# Patient Record
Sex: MALE | Race: OTHER | Employment: FULL TIME | URBAN - METROPOLITAN AREA
[De-identification: names, ages, dates, MRNs, and addresses within clinical notes are randomized per-mention and may not be internally consistent; named-entity substitution may affect disease eponyms.]

---

## 2018-08-03 ENCOUNTER — HOSPITAL ENCOUNTER (OUTPATIENT)
Dept: LAB | Age: 38
Discharge: HOME | End: 2018-08-03

## 2018-08-03 LAB
ALBUMIN SERPL-MCNC: 4.1 G/DL (ref 3.5–5)
ALBUMIN/GLOB SERPL: 1.2 {RATIO} (ref 1.1–2.2)
ALP SERPL-CCNC: 75 U/L (ref 45–117)
ALT SERPL-CCNC: 38 U/L (ref 12–78)
ANION GAP SERPL CALC-SCNC: 3 MMOL/L (ref 5–15)
AST SERPL-CCNC: 18 U/L (ref 15–37)
BILIRUB SERPL-MCNC: 0.6 MG/DL (ref 0.2–1)
BUN SERPL-MCNC: 9 MG/DL (ref 6–20)
BUN/CREAT SERPL: 13 (ref 12–20)
CALCIUM SERPL-MCNC: 8.8 MG/DL (ref 8.5–10.1)
CHLORIDE SERPL-SCNC: 105 MMOL/L (ref 97–108)
CHOLEST SERPL-MCNC: 206 MG/DL
CO2 SERPL-SCNC: 31 MMOL/L (ref 21–32)
CREAT SERPL-MCNC: 0.69 MG/DL (ref 0.7–1.3)
GLOBULIN SER CALC-MCNC: 3.4 G/DL (ref 2–4)
GLUCOSE SERPL-MCNC: 89 MG/DL (ref 65–100)
HDLC SERPL-MCNC: 46 MG/DL
HDLC SERPL: 4.5 {RATIO} (ref 0–5)
LDLC SERPL CALC-MCNC: 126.8 MG/DL (ref 0–100)
LIPID PROFILE,FLP: ABNORMAL
POTASSIUM SERPL-SCNC: 4.3 MMOL/L (ref 3.5–5.1)
PROT SERPL-MCNC: 7.5 G/DL (ref 6.4–8.2)
SODIUM SERPL-SCNC: 139 MMOL/L (ref 136–145)
TRIGL SERPL-MCNC: 166 MG/DL (ref ?–150)
VLDLC SERPL CALC-MCNC: 33.2 MG/DL

## 2018-08-03 PROCEDURE — 80061 LIPID PANEL: CPT | Performed by: NURSE PRACTITIONER

## 2018-08-03 PROCEDURE — 80053 COMPREHEN METABOLIC PANEL: CPT | Performed by: NURSE PRACTITIONER

## 2018-09-27 ENCOUNTER — HOSPITAL ENCOUNTER (OUTPATIENT)
Dept: GENERAL RADIOLOGY | Age: 38
Discharge: HOME | End: 2018-09-27
Payer: SUBSIDIZED

## 2018-09-27 DIAGNOSIS — M25.561 RIGHT KNEE PAIN: ICD-10-CM

## 2018-09-27 PROCEDURE — 73562 X-RAY EXAM OF KNEE 3: CPT

## 2019-12-16 ENCOUNTER — TELEPHONE (OUTPATIENT)
Dept: FAMILY MEDICINE CLINIC | Age: 39
End: 2019-12-16

## 2019-12-16 ENCOUNTER — HOSPITAL ENCOUNTER (EMERGENCY)
Age: 39
Discharge: HOME | End: 2019-12-16
Attending: STUDENT IN AN ORGANIZED HEALTH CARE EDUCATION/TRAINING PROGRAM
Payer: SELF-PAY

## 2019-12-16 VITALS
DIASTOLIC BLOOD PRESSURE: 84 MMHG | OXYGEN SATURATION: 97 % | SYSTOLIC BLOOD PRESSURE: 130 MMHG | HEART RATE: 107 BPM | BODY MASS INDEX: 33.13 KG/M2 | RESPIRATION RATE: 16 BRPM | TEMPERATURE: 99.6 F | WEIGHT: 180 LBS | HEIGHT: 62 IN

## 2019-12-16 DIAGNOSIS — E86.0 DEHYDRATION: ICD-10-CM

## 2019-12-16 DIAGNOSIS — H66.90 ACUTE OTITIS MEDIA, UNSPECIFIED OTITIS MEDIA TYPE: Primary | ICD-10-CM

## 2019-12-16 DIAGNOSIS — R00.0 TACHYCARDIA: ICD-10-CM

## 2019-12-16 DIAGNOSIS — R51.9 NONINTRACTABLE HEADACHE, UNSPECIFIED CHRONICITY PATTERN, UNSPECIFIED HEADACHE TYPE: ICD-10-CM

## 2019-12-16 LAB
FLUAV AG NPH QL IA: NEGATIVE
FLUBV AG NOSE QL IA: NEGATIVE

## 2019-12-16 PROCEDURE — 99284 EMERGENCY DEPT VISIT MOD MDM: CPT

## 2019-12-16 PROCEDURE — 74011250637 HC RX REV CODE- 250/637: Performed by: NURSE PRACTITIONER

## 2019-12-16 PROCEDURE — 74011250636 HC RX REV CODE- 250/636: Performed by: NURSE PRACTITIONER

## 2019-12-16 PROCEDURE — 96361 HYDRATE IV INFUSION ADD-ON: CPT

## 2019-12-16 PROCEDURE — 87804 INFLUENZA ASSAY W/OPTIC: CPT

## 2019-12-16 PROCEDURE — 96360 HYDRATION IV INFUSION INIT: CPT

## 2019-12-16 RX ORDER — OFLOXACIN 3 MG/ML
5 SOLUTION AURICULAR (OTIC) DAILY
Qty: 10 ML | Refills: 0 | Status: SHIPPED | OUTPATIENT
Start: 2019-12-16

## 2019-12-16 RX ORDER — ACETAMINOPHEN 325 MG/1
650 TABLET ORAL
Status: COMPLETED | OUTPATIENT
Start: 2019-12-16 | End: 2019-12-16

## 2019-12-16 RX ORDER — AMOXICILLIN AND CLAVULANATE POTASSIUM 875; 125 MG/1; MG/1
1 TABLET, FILM COATED ORAL 2 TIMES DAILY
Qty: 14 TAB | Refills: 0 | Status: SHIPPED | OUTPATIENT
Start: 2019-12-16 | End: 2019-12-23

## 2019-12-16 RX ADMIN — SODIUM CHLORIDE 1000 ML: 900 INJECTION, SOLUTION INTRAVENOUS at 10:58

## 2019-12-16 RX ADMIN — ACETAMINOPHEN 650 MG: 325 TABLET ORAL at 11:11

## 2019-12-16 NOTE — ED PROVIDER NOTES
This is a 49-year-old male who presents ambulatory to the have a headache, and bilateral ear pain. Patient states the pain started 2 days ago, started having drainage from the right ear today prompting an emergency room visit. Patient states the pain is 10 out of 10 in his right ear, he has not taken any medication prior to arrival for his pain. States that drainages pink-colored, showing this provider with a cotton ball. Denies chest pain, shortness of breath, dizziness, trauma to the ear canals. Denies any nausea or vomiting, fevers or chills. There are no further complaints at this time.     Manuel Flores MD  Past Medical History:  No date: External hemorrhoid, thrombosed  No date: GERD (gastroesophageal reflux disease)  Past Surgical History:  2006: HX APPENDECTOMY  2006: HX HERNIA REPAIR      Comment:  inguinal hernia             Past Medical History:   Diagnosis Date    External hemorrhoid, thrombosed     GERD (gastroesophageal reflux disease)        Past Surgical History:   Procedure Laterality Date    HX APPENDECTOMY  2006    HX HERNIA REPAIR  2006    inguinal hernia         Family History:   Problem Relation Age of Onset    Hypertension Mother     Heart Disease Mother        Social History     Socioeconomic History    Marital status:      Spouse name: Not on file    Number of children: Not on file    Years of education: Not on file    Highest education level: Not on file   Occupational History    Not on file   Social Needs    Financial resource strain: Not on file    Food insecurity:     Worry: Not on file     Inability: Not on file    Transportation needs:     Medical: Not on file     Non-medical: Not on file   Tobacco Use    Smoking status: Former Smoker    Smokeless tobacco: Never Used   Substance and Sexual Activity    Alcohol use: No    Drug use: No    Sexual activity: Yes     Partners: Female   Lifestyle    Physical activity:     Days per week: Not on file Minutes per session: Not on file    Stress: Not on file   Relationships    Social connections:     Talks on phone: Not on file     Gets together: Not on file     Attends Restorationist service: Not on file     Active member of club or organization: Not on file     Attends meetings of clubs or organizations: Not on file     Relationship status: Not on file    Intimate partner violence:     Fear of current or ex partner: Not on file     Emotionally abused: Not on file     Physically abused: Not on file     Forced sexual activity: Not on file   Other Topics Concern    Not on file   Social History Narrative    ** Merged History Encounter **              ALLERGIES: Bee sting [sting, bee]    Review of Systems   Constitutional: Negative for activity change, appetite change, chills, fatigue and fever. HENT: Positive for congestion, ear discharge, ear pain, sinus pressure and sinus pain. Negative for trouble swallowing. Eyes: Negative for photophobia, pain, redness, itching and visual disturbance. Respiratory: Negative for chest tightness and shortness of breath. Cardiovascular: Negative for chest pain and palpitations. Gastrointestinal: Negative for abdominal distention, abdominal pain, nausea and vomiting. Endocrine: Negative. Genitourinary: Negative for difficulty urinating, frequency and urgency. Musculoskeletal: Negative for back pain, neck pain and neck stiffness. Skin: Negative for color change, pallor, rash and wound. Allergic/Immunologic: Negative. Neurological: Positive for headaches. Negative for dizziness, syncope and weakness. Hematological: Does not bruise/bleed easily. Psychiatric/Behavioral: Negative for behavioral problems. The patient is not nervous/anxious.         Vitals:    12/16/19 0956   BP: 124/80   Pulse: (!) 113   Resp: 16   Temp: 99.6 °F (37.6 °C)   SpO2: 97%   Weight: 81.6 kg (180 lb)   Height: 5' 2\" (1.575 m)            Physical Exam  Vitals signs and nursing note reviewed. Exam conducted with a chaperone present. Constitutional:       General: He is not in acute distress. Appearance: Normal appearance. He is well-developed. HENT:      Head: Normocephalic and atraumatic. Right Ear: External ear normal.      Left Ear: External ear normal.      Ears:      Comments: Bilateral otitis media     Nose: Congestion present. Eyes:      General:         Right eye: No discharge. Left eye: No discharge. Conjunctiva/sclera: Conjunctivae normal.      Pupils: Pupils are equal, round, and reactive to light. Neck:      Musculoskeletal: Normal range of motion and neck supple. Vascular: No JVD. Trachea: No tracheal deviation. Cardiovascular:      Rate and Rhythm: Normal rate and regular rhythm. Heart sounds: Normal heart sounds. No murmur. No gallop. Pulmonary:      Effort: Pulmonary effort is normal. No respiratory distress. Breath sounds: Normal breath sounds. No wheezing or rales. Chest:      Chest wall: No tenderness. Abdominal:      General: Bowel sounds are normal. There is no distension. Palpations: Abdomen is soft. Tenderness: There is no tenderness. There is no guarding or rebound. Genitourinary:     Comments: Deferred    Musculoskeletal: Normal range of motion. General: No tenderness. Skin:     General: Skin is warm and dry. Capillary Refill: Capillary refill takes less than 2 seconds. Coloration: Skin is not pale. Findings: No erythema or rash. Neurological:      General: No focal deficit present. Mental Status: He is alert and oriented to person, place, and time. Motor: No weakness. Coordination: Coordination normal.   Psychiatric:         Mood and Affect: Mood normal.         Behavior: Behavior normal.         Thought Content:  Thought content normal.         Judgment: Judgment normal.          MDM  Number of Diagnoses or Management Options  Acute otitis media, unspecified otitis media type: new and requires workup  Dehydration: new and requires workup  Nonintractable headache, unspecified chronicity pattern, unspecified headache type: new and requires workup  Tachycardia: new and requires workup  Diagnosis management comments: Plan discharge to home and follow-up with PCP. Follow-up with ENT as an outpatient. P.o. antibiotics, antibiotic drops for his ear. Return to the emergency room with worsening symptoms. Patient in agreement with plan of care. 11:59 AM  Pt has been reexamined. Pt has no new complaints, changes or physical findings. Care plan outlined and precautions discussed. All available results were reviewed with pt. All medications were reviewed with pt. All of pt's questions and concerns were addressed. Pt agrees to F/U as instructed and agrees to return to ED upon further deterioration. Pt is ready to go home.   Tank Fitch NP      Procedures

## 2019-12-16 NOTE — TELEPHONE ENCOUNTER
Patient calling, used [employer]-agent id [id number]    Notes ear infection, notes pain Notes pain level 10. No current openings at time of call. Nurse Brian Valenzuela.  Took call

## 2019-12-16 NOTE — ED TRIAGE NOTES
Patient reports 2 days of headache, ear pain and drainage from the right ear- Patient reports B/L ear pain with drainage only coming from the right ear.

## 2019-12-16 NOTE — DISCHARGE INSTRUCTIONS
Patient Education        Deshidratación: Ann Marie Diaz - [ Dehydration: Care Instructions ]  Instrucciones de cuidado  La deshidratación ocurre cuando el cuerpo pierde demasiado líquido. Puede ocurrir cuando usted no nolberto suficiente agua o pierde grandes cantidades de líquidos del organismo debido a la diarrea, el vómito o la sudoración. La deshidratación grave puede ser mortal.  El agua y los minerales llamados electrolitos ayudan a recuperar el equilibrio de los líquidos en el organismo. Aprenda las señales tempranas de pérdida de líquido y noa más líquidos para prevenir la deshidratación. La atención de seguimiento es brendon parte clave de elaine tratamiento y seguridad. Asegúrese de hacer y acudir a todas las citas, y llame a elaine médico si está teniendo problemas. También es brendon buena idea saber los resultados de brenda exámenes y mantener brendon lista de los medicamentos que umang. ¿Cómo puede cuidarse en el hogar? · Para prevenir la deshidratación, noa abundantes líquidos, los suficientes sea para que elaine orina sea de color amarillo lupis o transparente sea el agua. Elija beber agua y otros líquidos ester sin cafeína hasta que se sienta mejor. Si tiene [employer] [facility] [employer], del corazón o del hígado y tiene que Bere's líquidos, hable con elaine médico antes de aumentar elaine consumo. · Si no siente ganas de comer o beber, trate de caty sorbos pequeños de agua, bebidas deportivas u otras bebidas rehidratantes. · Descanse lo suficiente. Cómo prevenir la deshidratación  · Chanetta Birks líquidos a elaine Tamara Lux y elaine rutina diaria, a menos que elaine médico le haya dicho lo contrario. · Noa más líquidos cuando tello calor. Noa aún más líquidos si hace mucho ejercicio. No tome bebidas con alcohol o cafeína. · Esté alerta a los síntomas de deshidratación. Estos incluyen:  ? [place] y pegajosa. ? Yvonnie Mall, y en poca cantidad. ? Ojos secos y hundidos. ? Sentirse muy cansado.   · Manish Console problemas pueden llevar a la deshidratación. Estos incluyen:  ? Diarrea, fiebre y vómito. ? Cualquier enfermedad con fiebre, sea la neumonía o la gripe. ? Actividades que causan mucha sudoración, sea tiana de resistencia y [place] felton en exteriores en un clima cálido o húmedo. ? Abuso o síndrome de abstinencia del alcohol o las drogas. ? Ciertos medicamentos, sea pastillas para el resfriado y la alergia (antihistamínicos), pastillas para adelgazar (diuréticos) y laxantes. ? Ciertas enfermedades, sea la diabetes, el cáncer, y la enfermedad del corazón o [place] Silver. ¿Cuándo debe pedir ayuda? Llame al 911 en cualquier momento que considere que necesita atención de [place]. Por ejemplo, llame si:    · Se desmayó (perdió el conocimiento).    Llame a elaine médico ahora mismo o busque atención médica inmediata si:    · Se siente confuso y no puede pensar con claridad.     · Siente mareos o aturdimiento, o que está a punto de desmayarse.     · Tiene señales de necesitar más líquidos. Tiene los ojos hundidos, la boca seca y [place] solo poca cantidad de color oscuro.     · No puede retener líquidos en el estómago.    Preste especial atención a los cambios en elaine jaison y asegúrese de comunicarse con elaine médico si:    · No produce lágrimas.     · Tiene la piel muy seca y esta regresa despacio a elaine lugar después de pellizcarla.     · Tiene la boca y los ojos muy secos. ¿Dónde puede encontrar más información en inglés? Ben Griffith a http://anthony-ryne.info/. Roxann Flemingo L340 en la búsqueda para aprender Arvilla Radha de \"Deshidratación: Instrucciones de cuidado - [ Dehydration: Care Instructions ]. \"  Revisado: 26 junio, 2019  Versión del contenido: 12.2  © 6370-5033 [employer]. Las instrucciones de cuidado fueron adaptadas bajo licencia por [employer] (which disclaims liability or warranty for this information).  Si usted tiene [place] afección médica o sobre estas instrucciones, siempre pregunte a elaine profesional de jaison. [facility] [employer] niega toda garantía o responsabilidad por elaine uso de esta información. Patient Education        Infección del oído (otitis media): Instrucciones de cuidado - [ Ear Infection (Otitis Media): Care Instructions ]  Instrucciones de cuidado    Brendon infección de oído podría comenzar con un resfriado y afectar el oído medio (otitis media). Puede doler mucho. La mayoría de las infecciones de oído sanan por sí solas en un par de días. A menudo, no se necesitan antibióticos. La razón es que muchas infecciones de oído están causadas por virus. Los antibióticos no funcionan contra los virus. Las dosis regulares de analgésicos (medicamentos para el dolor) son la mejor manera de reducir la fiebre y ayudarle a sentirse mejor. La atención de seguimiento es brendon parte clave de elaine tratamiento y seguridad. Asegúrese de hacer y acudir a todas las citas, y llame a elaine médico si está teniendo problemas. También es brendon buena idea saber los resultados de brenda exámenes y mantener brendon lista de los medicamentos que umang. ¿Cómo puede cuidarse en el hogar? · [employer] analgésicos exactamente sea le fueron indicados. ? Si el médico le recetó un analgésico, tómelo según las indicaciones. ? Si no está tomando un analgésico recetado, tome verónica de venta ayaz, sea acetaminofén (Tylenol), ibuprofeno (Advil, Motrin) o naproxeno (Aleve). Karen y siga todas las instrucciones de la [place]. ? No tome dos o más analgésicos al mismo tiempo a menos que el médico se lo haya indicado. Muchos analgésicos contienen acetaminofén, es decir, Tylenol. El exceso de acetaminofén (Tylenol) puede ser dañino. · Planee tomarse brendon dosis completa de analgésico antes de acostarse. Dormir lo suficiente le ayudará a sentirse mejor. · Pruebe con brendon toallita tibia húmeda en el oído. Raffi vez le ayude a aliviar el dolor.   · Si elaine médico le recetó antibióticos, tómelos según las indicaciones. No deje de tomarlos por el hecho de sentirse mejor. Debe caty todos los antibióticos hasta terminarlos. ¿Cuándo debe pedir ayuda? Llame a elaine médico ahora mismo o busque atención médica inmediata si:    · Tiene dolor de oído nuevo o que aumenta.     · Elaine oído presenta secreción de pus o heather nueva o que está aumentando.     · Tiene fiebre junto con rigidez en el bernardo o un dolor de russell intenso.    Preste especial atención a los cambios en elaine jaison y asegúrese de comunicarse con elaine médico si:    · Tiene síntomas nuevos o que empeoran.     · No mejora después de hebert tomado un antibiótico ammon 2 días. ¿Dónde puede encontrar más información en inglés? Conchis Ochoa a http://anthony-ryne.info/. Jennifer Lavon en la búsqueda para aprender más acerca de \"Infección del oído (otitis media): Instrucciones de cuidado - [ Ear Infection (Otitis Media): Care Instructions ]. \"  Revisado: 21 octubre, 2018  Versión del contenido: 12.2  © 1356-2646 [employer]wise, Incorporated. Las instrucciones de cuidado fueron adaptadas bajo licencia por [employer] (which disclaims liability or warranty for this information). Si usted tiene [place] afección médica o sobre estas instrucciones, siempre pregunte a elaine profesional de jaison. [employer], Incorporated niega toda garantía o responsabilidad por elaine uso de esta información.

## 2019-12-16 NOTE — ED NOTES
Patient given discharge instructions per provider, 2 prescriptions given. Patient verbalized understanding. Ambulatory from ED to home with friend.

## 2025-01-14 ENCOUNTER — APPOINTMENT (OUTPATIENT)
Facility: HOSPITAL | Age: 45
End: 2025-01-14

## 2025-01-14 ENCOUNTER — HOSPITAL ENCOUNTER (EMERGENCY)
Facility: HOSPITAL | Age: 45
Discharge: HOME | End: 2025-01-14
Attending: STUDENT IN AN ORGANIZED HEALTH CARE EDUCATION/TRAINING PROGRAM

## 2025-01-14 VITALS
SYSTOLIC BLOOD PRESSURE: 103 MMHG | DIASTOLIC BLOOD PRESSURE: 71 MMHG | BODY MASS INDEX: 28.65 KG/M2 | TEMPERATURE: 98.1 F | RESPIRATION RATE: 28 BRPM | HEIGHT: 65 IN | HEART RATE: 79 BPM | WEIGHT: 171.96 LBS | OXYGEN SATURATION: 96 %

## 2025-01-14 DIAGNOSIS — R10.84 GENERALIZED ABDOMINAL PAIN: ICD-10-CM

## 2025-01-14 DIAGNOSIS — R51.9 ACUTE NONINTRACTABLE HEADACHE, UNSPECIFIED HEADACHE TYPE: ICD-10-CM

## 2025-01-14 DIAGNOSIS — R07.9 CHEST PAIN, UNSPECIFIED TYPE: ICD-10-CM

## 2025-01-14 DIAGNOSIS — R52 BODY ACHES: Primary | ICD-10-CM

## 2025-01-14 LAB
ALBUMIN SERPL-MCNC: 3.8 G/DL (ref 3.5–5)
ALBUMIN/GLOB SERPL: 1 (ref 1.1–2.2)
ALP SERPL-CCNC: 83 U/L (ref 45–117)
ALT SERPL-CCNC: 45 U/L (ref 12–78)
ANION GAP SERPL CALC-SCNC: 5 MMOL/L (ref 2–12)
AST SERPL-CCNC: 26 U/L (ref 15–37)
BASOPHILS # BLD: 0.06 K/UL (ref 0–0.1)
BASOPHILS NFR BLD: 1 % (ref 0–1)
BILIRUB SERPL-MCNC: 0.8 MG/DL (ref 0.2–1)
BUN SERPL-MCNC: 13 MG/DL (ref 6–20)
BUN/CREAT SERPL: 14 (ref 12–20)
CALCIUM SERPL-MCNC: 8.8 MG/DL (ref 8.5–10.1)
CHLORIDE SERPL-SCNC: 104 MMOL/L (ref 97–108)
CO2 SERPL-SCNC: 28 MMOL/L (ref 21–32)
CREAT SERPL-MCNC: 0.94 MG/DL (ref 0.7–1.3)
DIFFERENTIAL METHOD BLD: NORMAL
EKG ATRIAL RATE: 98 BPM
EKG DIAGNOSIS: NORMAL
EKG P AXIS: 33 DEGREES
EKG P-R INTERVAL: 176 MS
EKG Q-T INTERVAL: 328 MS
EKG QRS DURATION: 88 MS
EKG QTC CALCULATION (BAZETT): 418 MS
EKG R AXIS: -18 DEGREES
EKG T AXIS: 21 DEGREES
EKG VENTRICULAR RATE: 98 BPM
EOSINOPHIL # BLD: 0 K/UL (ref 0–0.4)
EOSINOPHIL NFR BLD: 0 % (ref 0–7)
ERYTHROCYTE [DISTWIDTH] IN BLOOD BY AUTOMATED COUNT: 12.5 % (ref 11.5–14.5)
FLUAV RNA SPEC QL NAA+PROBE: NOT DETECTED
FLUBV RNA SPEC QL NAA+PROBE: NOT DETECTED
GLOBULIN SER CALC-MCNC: 3.9 G/DL (ref 2–4)
GLUCOSE SERPL-MCNC: 107 MG/DL (ref 65–100)
HCT VFR BLD AUTO: 43.5 % (ref 36.6–50.3)
HGB BLD-MCNC: 15 G/DL (ref 12.1–17)
IMM GRANULOCYTES # BLD AUTO: 0 K/UL (ref 0–0.04)
IMM GRANULOCYTES NFR BLD AUTO: 0 % (ref 0–0.5)
LYMPHOCYTES # BLD: 1.41 K/UL (ref 0.8–3.5)
LYMPHOCYTES NFR BLD: 22 % (ref 12–49)
MCH RBC QN AUTO: 31.8 PG (ref 26–34)
MCHC RBC AUTO-ENTMCNC: 34.5 G/DL (ref 30–36.5)
MCV RBC AUTO: 92.2 FL (ref 80–99)
MONOCYTES # BLD: 0.38 K/UL (ref 0–1)
MONOCYTES NFR BLD: 6 % (ref 5–13)
NEUTS BAND NFR BLD MANUAL: 3 %
NEUTS SEG # BLD: 4.55 K/UL (ref 1.8–8)
NEUTS SEG NFR BLD: 68 % (ref 32–75)
NRBC # BLD: 0 K/UL (ref 0–0.01)
NRBC BLD-RTO: 0 PER 100 WBC
PLATELET # BLD AUTO: 294 K/UL (ref 150–400)
PMV BLD AUTO: 9.5 FL (ref 8.9–12.9)
POTASSIUM SERPL-SCNC: 3.6 MMOL/L (ref 3.5–5.1)
PROT SERPL-MCNC: 7.7 G/DL (ref 6.4–8.2)
RBC # BLD AUTO: 4.72 M/UL (ref 4.1–5.7)
RBC MORPH BLD: NORMAL
SARS-COV-2 RNA RESP QL NAA+PROBE: NOT DETECTED
SODIUM SERPL-SCNC: 137 MMOL/L (ref 136–145)
SOURCE: NORMAL
TROPONIN I SERPL HS-MCNC: <4 NG/L (ref 0–76)
WBC # BLD AUTO: 6.4 K/UL (ref 4.1–11.1)

## 2025-01-14 PROCEDURE — 6370000000 HC RX 637 (ALT 250 FOR IP): Performed by: STUDENT IN AN ORGANIZED HEALTH CARE EDUCATION/TRAINING PROGRAM

## 2025-01-14 PROCEDURE — 87636 SARSCOV2 & INF A&B AMP PRB: CPT

## 2025-01-14 PROCEDURE — 93010 ELECTROCARDIOGRAM REPORT: CPT | Performed by: SPECIALIST

## 2025-01-14 PROCEDURE — 94761 N-INVAS EAR/PLS OXIMETRY MLT: CPT

## 2025-01-14 PROCEDURE — 99285 EMERGENCY DEPT VISIT HI MDM: CPT

## 2025-01-14 PROCEDURE — 84484 ASSAY OF TROPONIN QUANT: CPT

## 2025-01-14 PROCEDURE — 80053 COMPREHEN METABOLIC PANEL: CPT

## 2025-01-14 PROCEDURE — 70450 CT HEAD/BRAIN W/O DYE: CPT

## 2025-01-14 PROCEDURE — 93005 ELECTROCARDIOGRAM TRACING: CPT | Performed by: STUDENT IN AN ORGANIZED HEALTH CARE EDUCATION/TRAINING PROGRAM

## 2025-01-14 PROCEDURE — 36415 COLL VENOUS BLD VENIPUNCTURE: CPT

## 2025-01-14 PROCEDURE — 6360000002 HC RX W HCPCS: Performed by: STUDENT IN AN ORGANIZED HEALTH CARE EDUCATION/TRAINING PROGRAM

## 2025-01-14 PROCEDURE — 74176 CT ABD & PELVIS W/O CONTRAST: CPT

## 2025-01-14 PROCEDURE — 85025 COMPLETE CBC W/AUTO DIFF WBC: CPT

## 2025-01-14 PROCEDURE — 96365 THER/PROPH/DIAG IV INF INIT: CPT

## 2025-01-14 PROCEDURE — 71046 X-RAY EXAM CHEST 2 VIEWS: CPT

## 2025-01-14 PROCEDURE — 96375 TX/PRO/DX INJ NEW DRUG ADDON: CPT

## 2025-01-14 RX ORDER — DEXAMETHASONE SODIUM PHOSPHATE 4 MG/ML
4 INJECTION, SOLUTION INTRA-ARTICULAR; INTRALESIONAL; INTRAMUSCULAR; INTRAVENOUS; SOFT TISSUE ONCE
Status: COMPLETED | OUTPATIENT
Start: 2025-01-14 | End: 2025-01-14

## 2025-01-14 RX ORDER — KETOROLAC TROMETHAMINE 15 MG/ML
15 INJECTION, SOLUTION INTRAMUSCULAR; INTRAVENOUS
Status: COMPLETED | OUTPATIENT
Start: 2025-01-14 | End: 2025-01-14

## 2025-01-14 RX ORDER — ACETAMINOPHEN 325 MG/1
650 TABLET ORAL
Status: COMPLETED | OUTPATIENT
Start: 2025-01-14 | End: 2025-01-14

## 2025-01-14 RX ORDER — DIPHENHYDRAMINE HYDROCHLORIDE 50 MG/ML
12.5 INJECTION INTRAMUSCULAR; INTRAVENOUS ONCE
Status: COMPLETED | OUTPATIENT
Start: 2025-01-14 | End: 2025-01-14

## 2025-01-14 RX ORDER — METOCLOPRAMIDE HYDROCHLORIDE 5 MG/ML
10 INJECTION INTRAMUSCULAR; INTRAVENOUS ONCE
Status: COMPLETED | OUTPATIENT
Start: 2025-01-14 | End: 2025-01-14

## 2025-01-14 RX ORDER — KETOROLAC TROMETHAMINE 15 MG/ML
15 INJECTION, SOLUTION INTRAMUSCULAR; INTRAVENOUS
Status: DISCONTINUED | OUTPATIENT
Start: 2025-01-14 | End: 2025-01-14

## 2025-01-14 RX ORDER — MAGNESIUM SULFATE 1 G/100ML
1000 INJECTION INTRAVENOUS ONCE
Status: COMPLETED | OUTPATIENT
Start: 2025-01-14 | End: 2025-01-14

## 2025-01-14 RX ADMIN — ACETAMINOPHEN 650 MG: 325 TABLET ORAL at 08:49

## 2025-01-14 RX ADMIN — DEXAMETHASONE SODIUM PHOSPHATE 4 MG: 4 INJECTION INTRA-ARTICULAR; INTRALESIONAL; INTRAMUSCULAR; INTRAVENOUS; SOFT TISSUE at 11:02

## 2025-01-14 RX ADMIN — MAGNESIUM SULFATE HEPTAHYDRATE 1000 MG: 1 INJECTION, SOLUTION INTRAVENOUS at 11:01

## 2025-01-14 RX ADMIN — METOCLOPRAMIDE 10 MG: 5 INJECTION, SOLUTION INTRAMUSCULAR; INTRAVENOUS at 11:02

## 2025-01-14 RX ADMIN — KETOROLAC TROMETHAMINE 15 MG: 15 INJECTION, SOLUTION INTRAMUSCULAR; INTRAVENOUS at 08:51

## 2025-01-14 RX ADMIN — DIPHENHYDRAMINE HYDROCHLORIDE 12.5 MG: 50 INJECTION INTRAMUSCULAR; INTRAVENOUS at 11:02

## 2025-01-14 ASSESSMENT — PAIN - FUNCTIONAL ASSESSMENT: PAIN_FUNCTIONAL_ASSESSMENT: 0-10

## 2025-01-14 ASSESSMENT — LIFESTYLE VARIABLES
HOW MANY STANDARD DRINKS CONTAINING ALCOHOL DO YOU HAVE ON A TYPICAL DAY: PATIENT DOES NOT DRINK
HOW OFTEN DO YOU HAVE A DRINK CONTAINING ALCOHOL: NEVER

## 2025-01-14 ASSESSMENT — PAIN SCALES - GENERAL
PAINLEVEL_OUTOF10: 0
PAINLEVEL_OUTOF10: 0

## 2025-01-14 NOTE — ED PROVIDER NOTES
[facility] EMERGENCY DEPARTMENT  EMERGENCY DEPARTMENT ENCOUNTER      Pt Name: New Salas  MRN: [id number]  Birthdate 1980  Date of evaluation: 1/14/2025  Provider: Deanne Maier MD    CHIEF COMPLAINT       Chief Complaint   Patient presents with    Headache    Chest Pain         HISTORY OF PRESENT ILLNESS   (Location/Symptom, Timing/Onset, Context/Setting, Quality, Duration, Modifying Factors, Severity)  Note limiting factors.   The history is provided by the patient.     44-year-old male with history of GERD presenting for body pains.  Reports for the last 3 days has had generalized bodyaches, reports feeling achy all over in both his arms, his legs, his chest, his neck, is back, and his head.  Reports he has felt like he had a temperature and has felt some chills.  Also reports he has diffuse abdominal discomfort, has been constipated and has had hard stools, reports his last normal bowel movement was more than 2 days ago.  Reports no nausea or vomiting.  Reports no urinary changes.  Reports he has had a dull constant chest pain all across his chest since January 1, reports nothing makes it better nothing makes it worse, denies shortness of breath.  Reports no cough or mucus production.  Patient reports his headache is dull, not sudden onset, not worst headache of his life.  Reports no numbness, weakness, vision changes, difficulty with speech, confusion    Review of External Medical Records:         Nursing Notes were reviewed.      REVIEW OF SYSTEMS    (2-9 systems for level 4, 10 or more for level 5)     Except as noted above the remainder of the review of systems was reviewed and negative.       PAST MEDICAL HISTORY     Past Medical History:   Diagnosis Date    External hemorrhoid, thrombosed     GERD (gastroesophageal reflux disease)          SURGICAL HISTORY       Past Surgical History:   Procedure Laterality Date    APPENDECTOMY  2006    HERNIA REPAIR  2006     Decision-making details documented in ED Course.  Radiology: ordered. Decision-making details documented in ED Course.  ECG/medicine tests: ordered. Decision-making details documented in ED Course.    Risk  OTC drugs.  Prescription drug management.                REASSESSMENT     ED Course as of 01/17/25 1501   Tue Jan 14, 2025   0718 SARS-CoV-2, PCR: Not detected [SL]   0718 Rapid Influenza A By PCR: Not detected [SL]   0718 Rapid Influenza B By PCR: Not detected [SL]   0730 XR CHEST (2 VW)  Normal PA and lateral chest views. [SL]   0849 WBC: 6.4 [SL]   0849 Hemoglobin Quant: 15.0 [SL]   0849 Sodium: 137 [SL]   0849 Potassium: 3.6 [SL]   0849 Creatinine: 0.94 [SL]   0849 Troponin, High Sensitivity: <4 [SL]   0849 CT ABDOMEN PELVIS WO CONTRAST Additional Contrast? None  No acute abdominal or pelvic process. [SL]   0850 EKG 12 Lead [SL]   1004 On reevaluation patient resting comfortably, reports continued headache, requesting additional headache medication, migraine cocktail ordered.  CT head ordered to further evaluate patient's headache symptoms [SL]   1115 CT HEAD WO CONTRAST  No acute abnormality [SL]   1122 On reevaluation patient reports headache is improved, resting comfortably in no acute distress, advised patient of plan to follow-up with PCP, patient verbalized understanding and agreement [SL]      ED Course User Index  [SL] Deanne Maier MD           CONSULTS:  None    PROCEDURES:  Unless otherwise noted below, none     Procedures          FINAL IMPRESSION      1. Body aches    2. Acute nonintractable headache, unspecified headache type    3. Generalized abdominal pain    4. Chest pain, unspecified type            DISPOSITION/PLAN   DISPOSITION Decision To Discharge 01/14/2025 11:22:38 AM      PATIENT REFERRED TO:  [facility]  [address] [place] [id number] [phone]  Call in 1 day        DISCHARGE MEDICATIONS:  There are no

## 2025-01-14 NOTE — ED TRIAGE NOTES
Pt reports body ache, headache, abd pain, chills and low grade fever for the past 3 days  Also endorses some chest pain that started on Jan 1st  Denies cough,nausea or vomiting, diarrhea or constipation